# Patient Record
(demographics unavailable — no encounter records)

---

## 2025-02-03 NOTE — HISTORY OF PRESENT ILLNESS
[de-identified] : 5/13/24: Examination performed with radial and linear echoendoscopes. In the second portion of the duodenum a submucosal lesion was localized with in the submucosal layer.  It was anechoic, measuring 8 mm, with an appearance compatible with a cyst.  The pancreatic body and tail were extensively evalauted with radial/linear echoendoscopes -- unable to localize the previously characterized pancreatic cyst.  The PD measured 2 mm in the tail.  The left kidney and spleen were also visualized and appeared normal.  The distal esophagus appeared normal on EUS evaluation. [de-identified] : 5/13/24 Findings: The esophagus appeared normal.  The GE junction was located at 43 cm.  The stomach was unremarkable.  Duodenal bulb appeared normal.  In second portion of the duodenum proximal to the ampulla a submucosal nodule was seen.  Biopsied for marking.  The second portion of the duodenum was also evaluated with a duodenoscope to identify the ampulla and nodule. Path: Duodenum, second portion; biopsy: - Small intestinal mucosa without significant histologic abnormality. [FreeTextEntry1] : 10/12/2024 MRCP Conclusion: -Pancreatic tail 24 mm cystic lesion, slightly increased in size since 3/23/2024. This may represent an IPMN, however, other types of cystic neoplasms are not excluded. Continued imaging surveillence and/or tissue sampling can be considered. -Hepatic cysts, one of which contains debris -Probable subcapsular subcm hemangioma in the R hepatic lobe as well as an arterioportal shunt  MR/MRCP completed on 3/23/2024 showed 1.4 cm septated cyst in left hepatic lobe, gallbladder polyps and or gallstones, 2.2 cm lobulated multi-septated cystic lesion increased from 2 cm without dilation of the pancreatic duct.  MR Abdomen on 5/28/2023 showed 1.2 cm left hepatic cyst, gallbladder polyp or stones and a 1.9 x 1.4 x 1.9 cm lobulated multi-septated cystic lesion in the tail of the pancreas (previously 1.5 cm).

## 2025-02-03 NOTE — REASON FOR VISIT
[Follow-up] : a follow-up of an existing diagnosis [Family Member] : family member [Other: ______] : provided by CT [Interpreters_Relationshiptopatient] : family member as pt prefers [FreeTextEntry3] : Mandarin [TWNoteComboBox1] : Other

## 2025-02-03 NOTE — REVIEW OF SYSTEMS
[Negative] : Gastrointestinal [Fever] : no fever [Chills] : no chills [Recent Weight Gain (___ Lbs)] : no recent weight gain [Recent Weight Loss (___ Lbs)] : no recent weight loss

## 2025-02-03 NOTE — ASSESSMENT
[FreeTextEntry1] : Patient is a 72 y/o M with a history of PAF s/p ablation on Eliquis, DMII, HTN and HLD who presents for f/u of pancreatic cyst.  EGD/EUS 5/13/24:  In second portion of the duodenum proximal to the ampulla a submucosal nodule was seen (bx- Small intestinal mucosa without significant histologic abnormality). It was anechoic, measuring 8 mm, with an appearance compatible with a cyst.  The pancreatic body and tail were extensively evalauted with radial/linear echoendoscopes -- unable to localize the previously characterized pancreatic cyst.    #Pancreas Cyst/#Duodenal Nodule -MRCP 10/12/2024: Pancreatic tail 24 mm cystic lesion, slightly increased in size since 3/23/2024. -MRCP due 2/2025: ordered to be performed now for pancreas cyst assessment -Will assess pancreas cyst and duodenal nodule at EGD/EUS (planned for 1 year after last) -Schedule pt for EGD/[facility] at [facility] 5/[facility]. Discussed R/A/I/B with patient. Education provided on preparation instructions and the need for an escort.  -Instructed to hold Farxiga 3 days prior to procedure -Pending cardiac clearance/instructions for holding Eliquis from Dr. Jak Lizarraga -Request recent labs from PCP Dr. Trav Draper  F/u post-procedure  IBrie NP, am scribing for and in the presence of Dr. Kenji Ying the following sections: history of present illness, past medical/family/social history; review of systems; vital signs; physical exam; disposition.  Kenji MOHR MD, personally performed the services described in the documentation, reviewed the documentation recorded by the scribe in my presence and it accurately and completely records my words and actions.

## 2025-02-03 NOTE — HISTORY OF PRESENT ILLNESS
[de-identified] : 5/13/24 Findings: The esophagus appeared normal.  The GE junction was located at 43 cm.  The stomach was unremarkable.  Duodenal bulb appeared normal.  In second portion of the duodenum proximal to the ampulla a submucosal nodule was seen.  Biopsied for marking.  The second portion of the duodenum was also evaluated with a duodenoscope to identify the ampulla and nodule. Path: Duodenum, second portion; biopsy: - Small intestinal mucosa without significant histologic abnormality. [de-identified] : 5/13/24: Examination performed with radial and linear echoendoscopes. In the second portion of the duodenum a submucosal lesion was localized with in the submucosal layer.  It was anechoic, measuring 8 mm, with an appearance compatible with a cyst.  The pancreatic body and tail were extensively evalauted with radial/linear echoendoscopes -- unable to localize the previously characterized pancreatic cyst.  The PD measured 2 mm in the tail.  The left kidney and spleen were also visualized and appeared normal.  The distal esophagus appeared normal on EUS evaluation. [FreeTextEntry1] : 10/12/2024 MRCP Conclusion: -Pancreatic tail 24 mm cystic lesion, slightly increased in size since 3/23/2024. This may represent an IPMN, however, other types of cystic neoplasms are not excluded. Continued imaging surveillence and/or tissue sampling can be considered. -Hepatic cysts, one of which contains debris -Probable subcapsular subcm hemangioma in the R hepatic lobe as well as an arterioportal shunt  MR/MRCP completed on 3/23/2024 showed 1.4 cm septated cyst in left hepatic lobe, gallbladder polyps and or gallstones, 2.2 cm lobulated multi-septated cystic lesion increased from 2 cm without dilation of the pancreatic duct.  MR Abdomen on 5/28/2023 showed 1.2 cm left hepatic cyst, gallbladder polyp or stones and a 1.9 x 1.4 x 1.9 cm lobulated multi-septated cystic lesion in the tail of the pancreas (previously 1.5 cm).

## 2025-02-03 NOTE — ASSESSMENT
[FreeTextEntry1] : Patient is a 70 y/o M with a history of PAF s/p ablation on Eliquis, DMII, HTN and HLD who presents for f/u of pancreatic cyst.  EGD/EUS 5/13/24:  In second portion of the duodenum proximal to the ampulla a submucosal nodule was seen (bx- Small intestinal mucosa without significant histologic abnormality). It was anechoic, measuring 8 mm, with an appearance compatible with a cyst.  The pancreatic body and tail were extensively evalauted with radial/linear echoendoscopes -- unable to localize the previously characterized pancreatic cyst.    #Pancreas Cyst/#Duodenal Nodule -MRCP 10/12/2024: Pancreatic tail 24 mm cystic lesion, slightly increased in size since 3/23/2024. -MRCP due 2/2025: ordered to be performed now for pancreas cyst assessment -Will assess pancreas cyst and duodenal nodule at EGD/EUS (planned for 1 year after last) -Schedule pt for EGD/EUS at [facility] 5/2025. Discussed R/A/I/B with patient. Education provided on preparation instructions and the need for an escort.  -Instructed to hold Farxiga 3 days prior to procedure -Pending cardiac clearance/instructions for holding Eliquis from Dr. Jak Lizarraga -Request recent labs from PCP Dr. Trav Draper  F/u post-procedure  IBrie NP, am scribing for and in the presence of Dr. Kenji Ying the following sections: history of present illness, past medical/family/social history; review of systems; vital signs; physical exam; disposition.  Kenji MOHR MD, personally performed the services described in the documentation, reviewed the documentation recorded by the scribe in my presence and it accurately and completely records my words and actions.

## 2025-02-03 NOTE — HISTORY OF PRESENT ILLNESS
[de-identified] : 5/13/24: Examination performed with radial and linear echoendoscopes. In the second portion of the duodenum a submucosal lesion was localized with in the submucosal layer.  It was anechoic, measuring 8 mm, with an appearance compatible with a cyst.  The pancreatic body and tail were extensively evalauted with radial/linear echoendoscopes -- unable to localize the previously characterized pancreatic cyst.  The PD measured 2 mm in the tail.  The left kidney and spleen were also visualized and appeared normal.  The distal esophagus appeared normal on EUS evaluation. [de-identified] : 5/13/24 Findings: The esophagus appeared normal.  The GE junction was located at 43 cm.  The stomach was unremarkable.  Duodenal bulb appeared normal.  In second portion of the duodenum proximal to the ampulla a submucosal nodule was seen.  Biopsied for marking.  The second portion of the duodenum was also evaluated with a duodenoscope to identify the ampulla and nodule. Path: Duodenum, second portion; biopsy: - Small intestinal mucosa without significant histologic abnormality. [FreeTextEntry1] : 10/12/2024 MRCP Conclusion: -Pancreatic tail 24 mm cystic lesion, slightly increased in size since 3/23/2024. This may represent an IPMN, however, other types of cystic neoplasms are not excluded. Continued imaging surveillence and/or tissue sampling can be considered. -Hepatic cysts, one of which contains debris -Probable subcapsular subcm hemangioma in the R hepatic lobe as well as an arterioportal shunt  MR/MRCP completed on 3/23/2024 showed 1.4 cm septated cyst in left hepatic lobe, gallbladder polyps and or gallstones, 2.2 cm lobulated multi-septated cystic lesion increased from 2 cm without dilation of the pancreatic duct.  MR Abdomen on 5/28/2023 showed 1.2 cm left hepatic cyst, gallbladder polyp or stones and a 1.9 x 1.4 x 1.9 cm lobulated multi-septated cystic lesion in the tail of the pancreas (previously 1.5 cm).

## 2025-02-03 NOTE — ASSESSMENT
[FreeTextEntry1] : Patient is a 70 y/o M with a history of PAF s/p ablation on Eliquis, DMII, HTN and HLD who presents for f/u of pancreatic cyst.  EGD/EUS 5/13/24:  In second portion of the duodenum proximal to the ampulla a submucosal nodule was seen (bx- Small intestinal mucosa without significant histologic abnormality). It was anechoic, measuring 8 mm, with an appearance compatible with a cyst.  The pancreatic body and tail were extensively evalauted with radial/linear echoendoscopes -- unable to localize the previously characterized pancreatic cyst.    #Pancreas Cyst/#Duodenal Nodule -MRCP 10/12/2024: Pancreatic tail 24 mm cystic lesion, slightly increased in size since 3/23/2024. -MRCP due 2/2025: ordered to be performed now for pancreas cyst assessment -Will assess pancreas cyst and duodenal nodule at EGD/EUS (planned for 1 year after last) -Schedule pt for EGD/EUS at [facility] 5/2025. Discussed R/A/I/B with patient. Education provided on preparation instructions and the need for an escort.  -Instructed to hold Farxiga 3 days prior to procedure -Pending cardiac clearance/instructions for holding Eliquis from Dr. Jak Lizarraga -Request recent labs from PCP Dr. Trav Draper  F/u post-procedure  IBrie NP, am scribing for and in the presence of Dr. Kenji Ying the following sections: history of present illness, past medical/family/social history; review of systems; vital signs; physical exam; disposition.  Kejni MOHR MD, personally performed the services described in the documentation, reviewed the documentation recorded by the scribe in my presence and it accurately and completely records my words and actions.